# Patient Record
Sex: FEMALE | Race: WHITE | NOT HISPANIC OR LATINO | Employment: FULL TIME | URBAN - METROPOLITAN AREA
[De-identification: names, ages, dates, MRNs, and addresses within clinical notes are randomized per-mention and may not be internally consistent; named-entity substitution may affect disease eponyms.]

---

## 2017-03-07 ENCOUNTER — OFFICE VISIT (OUTPATIENT)
Dept: OPHTHALMOLOGY | Facility: CLINIC | Age: 62
End: 2017-03-07
Payer: COMMERCIAL

## 2017-03-07 DIAGNOSIS — H52.13 MYOPIA WITH PRESBYOPIA, BILATERAL: ICD-10-CM

## 2017-03-07 DIAGNOSIS — Z01.00 ENCOUNTER FOR EXAMINATION OF EYES AND VISION WITHOUT ABNORMAL FINDINGS: Primary | ICD-10-CM

## 2017-03-07 DIAGNOSIS — H52.4 MYOPIA WITH PRESBYOPIA, BILATERAL: ICD-10-CM

## 2017-03-07 DIAGNOSIS — Z13.5 GLAUCOMA SCREENING: ICD-10-CM

## 2017-03-07 PROCEDURE — 92014 COMPRE OPH EXAM EST PT 1/>: CPT | Mod: S$GLB,,, | Performed by: OPTOMETRIST

## 2017-03-07 PROCEDURE — 92015 DETERMINE REFRACTIVE STATE: CPT | Mod: S$GLB,,, | Performed by: OPTOMETRIST

## 2017-03-07 PROCEDURE — 99999 PR PBB SHADOW E&M-EST. PATIENT-LVL II: CPT | Mod: PBBFAC,,, | Performed by: OPTOMETRIST

## 2017-03-07 RX ORDER — FERROUS SULFATE, DRIED 160(50) MG
1 TABLET, EXTENDED RELEASE ORAL
COMMUNITY

## 2017-03-07 RX ORDER — MULTIVITAMIN
1 TABLET ORAL
COMMUNITY

## 2017-03-07 RX ORDER — ERGOCALCIFEROL (VITAMIN D2) 10 MCG
TABLET ORAL
COMMUNITY

## 2017-03-07 NOTE — MR AVS SNAPSHOT
[facility] - Ophthalmology  [address] [facility] [place] [address]  Phone: [phone]  Fax: [phone]                  Hetal Schreiber   3/7/2017 2:45 PM   Office Visit    Description:  Female : 1955   Provider:  Katlyn Hyman OD   Department:  [facility] - Ophthalmology           Reason for Visit     Eye Exam           Diagnoses this Visit        Comments    Encounter for examination of eyes and vision without abnormal findings    -  Primary     Glaucoma screening         Myopia with presbyopia, bilateral                To Do List           Goals (5 Years of Data)     None      Follow-Up and Disposition     Return in about 1 year (around 3/7/2018).      [facility]     [facility] On Call Nurse Care Line -  Assistance  Registered nurses in the [facility] On Call Center provide clinical advisement, health education, appointment booking, and other advisory services.  Call for this free service at [phone].             Medications           Message regarding Medications     Verify the changes and/or additions to your medication regime listed below are the same as discussed with your clinician today.  If any of these changes or additions are incorrect, please notify your healthcare provider.             Verify that the below list of medications is an accurate representation of the medications you are currently taking.  If none reported, the list may be blank. If incorrect, please contact your healthcare provider. Carry this list with you in case of emergency.           Current Medications     BIOTIN ORAL Take by mouth.    CALCIUM CARBONATE (CALCIUM 300 ORAL) Take by mouth.    calcium-vitamin D3 500 mg(1,250mg) -200 unit per tablet Take 1 tablet by mouth.    ergocalciferol, vitamin D2, 400 unit Tab Take by mouth.    multivitamin (THERAGRAN) per tablet Take 1 tablet by mouth.    VITAMIN K2 ORAL Take by mouth.           Clinical Reference Information           Allergies as of 3/7/2017     No Known  Allergies      Immunizations Administered on Date of Encounter - 3/7/2017     None      [facility] Sign-Up     Activating your [facility] account is as easy as 1-2-3!     1) Visit [email or web address], select Sign Up Now, enter this activation code and your date of birth, then select Next.  [email or web address]  Expires: 4/23/2017  5:47 PM      2) Create a username and password to use when you visit [facility] in the future and select a security question in case you lose your password and select Next.    3) Enter your e-mail address and click Sign Up!    Additional Information  If you have questions, please e-mail [email or web address] or call [phone] to talk to our [facility] staff. Remember, [facility] is NOT to be used for urgent needs. For medical emergencies, dial 911.         Language Assistance Services     ATTENTION: Language assistance services are available, free of charge. Please call [phone].      ATENCIÓN: Si habla español, tiene a faith disposición servicios gratuitos de asistencia lingüística. Llame al [phone].     CHÚ Ý: N?u b?n nói Ti?ng Vi?t, có các d?ch v? h? tr? ngôn ng? mi?n phí dành cho b?n. G?i s? [phone].         [facility] complies with applicable Federal civil rights laws and does not discriminate on the basis of race, color, national origin, age, disability, or sex.

## 2017-03-09 NOTE — PROGRESS NOTES
HPI     Eye Exam    Additional comments: routine-update cls           Comments   Pt was last seen 11/30/15 by [facility]. Wears OD lens only for distance. States   possible change in both distance and near. No other complaints. Not using   any gtts.        Last edited by Melvina Lemos on 3/7/2017  2:49 PM. (History)            Assessment /Plan     For exam results, see Encounter Report.    Encounter for examination of eyes and vision without abnormal findings    Glaucoma screening    Myopia with presbyopia, bilateral      Glaucoma screening and fundus exam normal.  Updated glasses and contact lens prescriptions.  Return to clinic 1 yr.

## 2017-03-20 ENCOUNTER — TELEPHONE (OUTPATIENT)
Dept: OPHTHALMOLOGY | Facility: CLINIC | Age: 62
End: 2017-03-20

## 2017-03-20 NOTE — TELEPHONE ENCOUNTER
----- Message from Sandra Gallegos sent at 3/20/2017  8:04 AM CDT -----  Contact: pt  [phone] calling she states she was seen a couple of weeks ago and given an rx for contact lens pt states rx is written wrong pls advise

## 2017-03-20 NOTE — TELEPHONE ENCOUNTER
Called patient who informed me that the diameter on her contact lens rx was entered in incorrectly. I made the adjustment and am e-mailing the correct rx to her.  Melvina

## 2018-05-01 ENCOUNTER — OFFICE VISIT (OUTPATIENT)
Dept: OPHTHALMOLOGY | Facility: CLINIC | Age: 63
End: 2018-05-01
Payer: COMMERCIAL

## 2018-05-01 DIAGNOSIS — Z13.5 GLAUCOMA SCREENING: ICD-10-CM

## 2018-05-01 DIAGNOSIS — Z01.00 ENCOUNTER FOR EXAMINATION OF EYES AND VISION WITHOUT ABNORMAL FINDINGS: Primary | ICD-10-CM

## 2018-05-01 DIAGNOSIS — H52.203 MYOPIA OF BOTH EYES WITH ASTIGMATISM AND PRESBYOPIA: ICD-10-CM

## 2018-05-01 DIAGNOSIS — H52.4 MYOPIA OF BOTH EYES WITH ASTIGMATISM AND PRESBYOPIA: ICD-10-CM

## 2018-05-01 DIAGNOSIS — H52.13 MYOPIA OF BOTH EYES WITH ASTIGMATISM AND PRESBYOPIA: ICD-10-CM

## 2018-05-01 PROCEDURE — 92015 DETERMINE REFRACTIVE STATE: CPT | Mod: S$GLB,,, | Performed by: OPTOMETRIST

## 2018-05-01 PROCEDURE — 92014 COMPRE OPH EXAM EST PT 1/>: CPT | Mod: S$GLB,,, | Performed by: OPTOMETRIST

## 2018-05-01 PROCEDURE — 99999 PR PBB SHADOW E&M-EST. PATIENT-LVL II: CPT | Mod: PBBFAC,,, | Performed by: OPTOMETRIST

## 2018-05-01 NOTE — PROGRESS NOTES
HPI     Eye Exam    Additional comments: Yearly           Comments   Last seen by [facility] on 3/7/17 for yearly eye exam.   PT states no noticeable changes in vision.   Pt wears a CTL OD.   No other complaints.       Last edited by Ginger Fofana, PCT on 5/1/2018  8:49 AM. (History)            Assessment /Plan     For exam results, see Encounter Report.    Encounter for examination of eyes and vision without abnormal findings    Glaucoma screening    Myopia of both eyes with astigmatism and presbyopia      Glaucoma screening and fundus exam normal.  Updated glasses and contact lens prescription.  Return to clinic 1 yr.

## 2018-05-24 ENCOUNTER — TELEPHONE (OUTPATIENT)
Dept: OPHTHALMOLOGY | Facility: CLINIC | Age: 63
End: 2018-05-24

## 2018-05-24 NOTE — TELEPHONE ENCOUNTER
----- Message from Emi Melchor sent at 5/23/2018  4:14 PM CDT -----  Contact: Jeanne/[employer]  Jeanne needs a call back at [phone], Regards to an error on the prescription.    Thanks  Td

## 2018-09-04 ENCOUNTER — TELEPHONE (OUTPATIENT)
Dept: OPHTHALMOLOGY | Facility: CLINIC | Age: 63
End: 2018-09-04

## 2018-09-04 NOTE — TELEPHONE ENCOUNTER
Pt is concerned about lens diameter.  Her box says it is 14.2 and the Rx she was given says it's 14.0.  She would like this corrected and sent to her in the mail.

## 2018-09-04 NOTE — TELEPHONE ENCOUNTER
----- Message from Sandra Gallegos sent at 9/4/2018 10:03 AM CDT -----  States she's calling rg having some questions about her rx contact lens prescription is wrong  She can be reached at [phone]

## 2019-10-22 ENCOUNTER — OFFICE VISIT (OUTPATIENT)
Dept: OPHTHALMOLOGY | Facility: CLINIC | Age: 64
End: 2019-10-22
Payer: COMMERCIAL

## 2019-10-22 DIAGNOSIS — H52.13 MYOPIA OF BOTH EYES WITH ASTIGMATISM AND PRESBYOPIA: ICD-10-CM

## 2019-10-22 DIAGNOSIS — Z01.00 ENCOUNTER FOR EXAMINATION OF EYES AND VISION WITHOUT ABNORMAL FINDINGS: Primary | ICD-10-CM

## 2019-10-22 DIAGNOSIS — H52.4 MYOPIA OF BOTH EYES WITH ASTIGMATISM AND PRESBYOPIA: ICD-10-CM

## 2019-10-22 DIAGNOSIS — Z13.5 GLAUCOMA SCREENING: ICD-10-CM

## 2019-10-22 DIAGNOSIS — H52.203 MYOPIA OF BOTH EYES WITH ASTIGMATISM AND PRESBYOPIA: ICD-10-CM

## 2019-10-22 PROCEDURE — 92014 PR EYE EXAM, EST PATIENT,COMPREHESV: ICD-10-PCS | Mod: S$GLB,,, | Performed by: OPTOMETRIST

## 2019-10-22 PROCEDURE — 92015 DETERMINE REFRACTIVE STATE: CPT | Mod: S$GLB,,, | Performed by: OPTOMETRIST

## 2019-10-22 PROCEDURE — 92015 PR REFRACTION: ICD-10-PCS | Mod: S$GLB,,, | Performed by: OPTOMETRIST

## 2019-10-22 PROCEDURE — 99999 PR PBB SHADOW E&M-EST. PATIENT-LVL II: CPT | Mod: PBBFAC,,, | Performed by: OPTOMETRIST

## 2019-10-22 PROCEDURE — 92014 COMPRE OPH EXAM EST PT 1/>: CPT | Mod: S$GLB,,, | Performed by: OPTOMETRIST

## 2019-10-22 PROCEDURE — 99999 PR PBB SHADOW E&M-EST. PATIENT-LVL II: ICD-10-PCS | Mod: PBBFAC,,, | Performed by: OPTOMETRIST

## 2019-10-22 RX ORDER — METHYLPHENIDATE HYDROCHLORIDE 10 MG/1
TABLET ORAL
COMMUNITY
Start: 2019-07-08

## 2019-10-22 NOTE — PROGRESS NOTES
HPI     Eye Exam      Additional comments: Yearly              Comments     [place] [facility] [place] for [place] eye exam  Patient here today for yearly eye exam  Has noticeable changes in near vision since last eye exam  Wears CTL in OD only  Comfortable with current CTL  No other complaints  No drops          Last edited by Ginger Fofana, PCT on 10/22/2019  3:12 PM.   (History)            Assessment /Plan     For exam results, see Encounter Report.    Encounter for examination of eyes and vision without abnormal findings    Glaucoma screening    Myopia of both eyes with astigmatism and presbyopia      Glaucoma screening and fundus exam normal.  Decrease in myopia - contact lens no longer indicated.  Patient has never been sensitive to uncorrected astigmatism and has not benefited from astigmatism correction in the past, therefore, no prescription glasses needed.  Over the counter readers as needed..  Return to clinic 1 yr.

## 2020-04-30 ENCOUNTER — TELEPHONE (OUTPATIENT)
Dept: OPHTHALMOLOGY | Facility: CLINIC | Age: 65
End: 2020-04-30

## 2020-04-30 NOTE — TELEPHONE ENCOUNTER
Spoke with patient.  She has had some floaters and flashes along with blurred vision.  Vision seems to clear on blinking.  Is not able to distinguish which eye might involved.  She had a visit with a friend yesterday, and had blurred vision at social distance range when looking at her friend.  Offered appt today with Dr. 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  Advised to call if any changes, and we will get her in for appt.

## 2020-04-30 NOTE — TELEPHONE ENCOUNTER
----- Message from Jsesica Bae sent at 4/30/2020  6:09 AM CDT -----  Contact: Pt request via [facility]  Message     Appointment Request From: Hetal Schreiber    With Provider: Katlyn Hyman    Preferred Date Range: Any    Preferred Times: Any time    Reason for visit: Concerns about vision: flashes in peripheral vision; floaters in vision    Comments:  I would like Dr. Hyman to examine my eyes to determine whether  there is a problem with the retina in either eye.

## 2020-04-30 NOTE — TELEPHONE ENCOUNTER
----- Message from Jessica Bae sent at 4/30/2020  6:09 AM CDT -----  Contact: Pt request via [facility]  Message     Appointment Request From: Hetal Schreiber    With Provider: Katlyn Hyman    Preferred Date Range: Any    Preferred Times: Any time    Reason for visit: Concerns about vision: flashes in peripheral vision; floaters in vision    Comments:  I would like Dr. Hyman to examine my eyes to determine whether  there is a problem with the retina in either eye.